# Patient Record
Sex: MALE | Race: WHITE | NOT HISPANIC OR LATINO | Employment: FULL TIME | URBAN - METROPOLITAN AREA
[De-identification: names, ages, dates, MRNs, and addresses within clinical notes are randomized per-mention and may not be internally consistent; named-entity substitution may affect disease eponyms.]

---

## 2018-10-04 ENCOUNTER — HOSPITAL ENCOUNTER (EMERGENCY)
Facility: MEDICAL CENTER | Age: 48
End: 2018-10-04
Attending: EMERGENCY MEDICINE
Payer: COMMERCIAL

## 2018-10-04 VITALS
HEIGHT: 71 IN | OXYGEN SATURATION: 98 % | RESPIRATION RATE: 16 BRPM | TEMPERATURE: 97.6 F | DIASTOLIC BLOOD PRESSURE: 84 MMHG | WEIGHT: 194.22 LBS | SYSTOLIC BLOOD PRESSURE: 140 MMHG | BODY MASS INDEX: 27.19 KG/M2 | HEART RATE: 47 BPM

## 2018-10-04 DIAGNOSIS — L02.91 ABSCESS: ICD-10-CM

## 2018-10-04 DIAGNOSIS — L03.90 CELLULITIS, UNSPECIFIED CELLULITIS SITE: ICD-10-CM

## 2018-10-04 DIAGNOSIS — D23.22 CYST, DERMOID, EAR, LEFT: ICD-10-CM

## 2018-10-04 PROCEDURE — 303977 HCHG I & D

## 2018-10-04 PROCEDURE — 99284 EMERGENCY DEPT VISIT MOD MDM: CPT

## 2018-10-04 PROCEDURE — 87077 CULTURE AEROBIC IDENTIFY: CPT

## 2018-10-04 PROCEDURE — 87070 CULTURE OTHR SPECIMN AEROBIC: CPT

## 2018-10-04 PROCEDURE — 87186 SC STD MICRODIL/AGAR DIL: CPT

## 2018-10-04 PROCEDURE — 87205 SMEAR GRAM STAIN: CPT

## 2018-10-04 RX ORDER — OMEPRAZOLE 20 MG/1
20 CAPSULE, DELAYED RELEASE ORAL DAILY
COMMUNITY

## 2018-10-04 RX ORDER — LEVOTHYROXINE SODIUM 0.1 MG/1
100 TABLET ORAL
COMMUNITY

## 2018-10-04 RX ORDER — DOXYCYCLINE HYCLATE 100 MG
100 TABLET ORAL 2 TIMES DAILY
COMMUNITY

## 2018-10-04 RX ORDER — CETIRIZINE HYDROCHLORIDE 10 MG/1
10 TABLET ORAL DAILY
COMMUNITY

## 2018-10-04 RX ORDER — IBUPROFEN 600 MG/1
600 TABLET ORAL EVERY 6 HOURS PRN
COMMUNITY

## 2018-10-04 ASSESSMENT — PAIN SCALES - GENERAL: PAINLEVEL_OUTOF10: 8

## 2018-10-05 LAB
GRAM STN SPEC: NORMAL
SIGNIFICANT IND 70042: NORMAL
SITE SITE: NORMAL
SOURCE SOURCE: NORMAL

## 2018-10-05 NOTE — ED PROVIDER NOTES
ED Provider Note    CHIEF COMPLAINT  Chief Complaint   Patient presents with   • Abscess        HPI  Oniel Coates is a 48 y.o. male who presents to the ED with complaints of an abscess behind the left ear.  Patient states about a week ago started to be a slightly large abscess that was draining apparently on its own.  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  Since then he states the abscess is still there seems to be getting slightly worse she presents to ED for evaluation.  Patient denies any fevers chills nausea vomiting he has been on doxycycline for about 2 days and is here for evaluation.    REVIEW OF SYSTEMS  See HPI for further details. All other systems are negative.     PAST MEDICAL HISTORY  History reviewed. No pertinent past medical history.    FAMILY HISTORY  History reviewed. No pertinent family history.  Patient's family history has been discussed and is been found to be noncontributory to his present illness  SOCIAL HISTORY  Social History     Social History   • Marital status: Single     Spouse name: N/A   • Number of children: N/A   • Years of education: N/A     Social History Main Topics   • Smoking status: Never Smoker   • Smokeless tobacco: Never Used   • Alcohol use Yes   • Drug use: No   • Sexual activity: Not on file     Other Topics Concern   • Not on file     Social History Narrative   • No narrative on file      No primary care provider on file.       SURGICAL HISTORY  History reviewed. No pertinent surgical history.    CURRENT MEDICATIONS   Home Medications     Reviewed by Clover Pereira R.N. (Registered Nurse) on 10/04/18 at 2005  Med List Status: Complete   Medication Last Dose Status   cetirizine (ZYRTEC) 10 MG Tab 10/4/2018 Active   doxycycline (VIBRAMYCIN) 100 MG Tab 10/4/2018 Active   ibuprofen (MOTRIN) 600 MG Tab 10/4/2018 Active   levothyroxine (SYNTHROID) 100 MCG Tab 10/4/2018 Active   omeprazole (PRILOSEC) 20 MG delayed-release  "capsule 10/4/2018 Active                ALLERGIES   No Known Allergies    PHYSICAL EXAM  VITAL SIGNS: /84   Pulse (!) 47   Temp 36.4 °C (97.6 °F)   Resp 16   Ht 1.803 m (5' 11\")   Wt 88.1 kg (194 lb 3.6 oz)   SpO2 98%   BMI 27.09 kg/m²    Pulse Ox interpretation nonhypoxic    Constitutional: Well developed, Well nourished, No acute distress, Non-toxic appearance.   Cardiovascular: Regular rate and rhythm without murmurs gallops or rubs.   Thorax & Lungs: Lungs are clear to auscultation bilaterally, there are no wheezes no rales. Chest wall is nontender.  Skin: Patient does have a abscess behind the left ear that is approximately 1 cm in diameter.  There is a central scab I was unable to get any discharge from it with gentle squeezing.  There is surrounding erythema.  Slightly tender in the area.  Extremities: Intact distal pulses, no clubbing, no cyanosis, no edema, nontender.      Incision and Drainage Procedure Note    Indication: Abscess    Procedure: The patient was positioned appropriately and the skin over the incision site was prepped with alcohol. Local anesthesia was obtained by infiltration using 1% Lidocaine with epinephrine.  An incision was then made over the apex of the lesion and very minimal purulence returned.  There appears to be thick material within material was expressed. Loculations were broken up using forceps and more of the material was able to be expressed. The drainage cavity was then packed with sterile gauze. The patient’s tetanus status was up to date and did not require a booster dose.    The patient tolerated the procedure well.    Complications: None      COURSE & MEDICAL DECISION MAKING  Pertinent Labs & Imaging studies reviewed. (See chart for details)  Patient presents with an abscess behind the left ear.  It was I&D at the evaluation of I think it is most of a infected dermoid cyst.  Is unable to completely clean out the core.  I will keep a drain in it.  Of " recommended cleaning techniques at home to follow-up with primary care physician 1 week for recheck return as needed.  The patient is to continue with the doxycycline.  I did send the culture down for evaluation of the wound.    FINAL IMPRESSION  1. Cyst, dermoid, ear, left    2. Abscess    3. Cellulitis, unspecified cellulitis site        The patient will return for new or worsening symptoms and is stable at the time of discharge.    The patient is referred to a primary physician for blood pressure management, diabetic screening, and for all other preventative health concerns.        DISPOSITION:  Patient will be discharged home in stable condition.    FOLLOW UP:  No follow-up provider specified.    OUTPATIENT MEDICATIONS:  Discharge Medication List as of 10/4/2018  9:40 PM            Electronically signed by: Oniel Diaz, 10/4/2018 8:35 PM

## 2018-10-05 NOTE — ED NOTES
Discharge information reviewed in detail. Patient verbalized understanding of discharge instructions to follow up with PCP and to return to ER if condition worsens.  Patient ambulated out of ER in a steady gait.

## 2018-10-07 LAB
BACTERIA WND AEROBE CULT: ABNORMAL
BACTERIA WND AEROBE CULT: ABNORMAL
GRAM STN SPEC: ABNORMAL
SIGNIFICANT IND 70042: ABNORMAL
SITE SITE: ABNORMAL
SOURCE SOURCE: ABNORMAL

## 2018-10-09 NOTE — ED NOTES
"ED Positive Culture Follow-up/Notification Note:   Date: 10/9/18    Patient seen in the ED on 10/4/2018 for a worsening abscess behind his left ear after taking two days of doxcycline from an outside physician.   1. Cyst, dermoid, ear, left    2. Abscess    3. Cellulitis, unspecified cellulitis site      Patient received I&D in the ED. However, the core was left in due to the inability to complete drain the abscess. The patient was educated to follow up with a primary physician.  Discharge Medication List as of 10/4/2018  9:40 PM        Allergies: Patient has no known allergies.    Vitals:    10/04/18 2002 10/04/18 2003 10/04/18 2140   BP:  140/84    Pulse:  (!) 50 (!) 47   Resp:  16    Temp:  (!) 35.7 °C (96.2 °F) 36.4 °C (97.6 °F)   SpO2:   98%   Weight: 88.1 kg (194 lb 3.6 oz)     Height: 1.803 m (5' 11\")         Final cultures:   Results     Procedure Component Value Units Date/Time    CULTURE WOUND W/ GRAM STAIN [[id number]]  (Abnormal)  (Susceptibility) Collected:  10/04/18 2123    Order Status:  Completed Specimen:  Wound from Abscess Updated:  10/07/18 1027     Significant Indicator POS (POS)     Source WND     Site ABSCESS     Culture Result Wound -- (A)     Gram Stain Result Many WBCs.  Moderate Gram positive cocci.       Culture Result Wound Methicillin Resistant Staphylococcus aureus  Moderate growth   (A)    Narrative:       CALL  [facility] tel. [phone],  CALLED  EDS tel. [phone] 10/07/2018, 10:26, [facility] ER [phone] + faxed to [facility]    Culture & Susceptibility     METHICILLIN RESISTANT STAPHYLOCOCCUS AUREUS     Antibiotic Sensitivity Microscan Unit Status    Ampicillin/sulbactam Resistant <=8/4 mcg/mL Final    Method: SENSITIVITY, JOE    Clindamycin Resistant >4 mcg/mL Final    Method: SENSITIVITY, JOE    Daptomycin Sensitive <=0.5 mcg/mL Final    Method: SENSITIVITY, JOE    Erythromycin Resistant >4 mcg/mL Final    Method: SENSITIVITY, JOE    Moxifloxacin Sensitive 2 mcg/mL Final    Method: " SENSITIVITY, JOE    Oxacillin Resistant >2 mcg/mL Final    Method: SENSITIVITY, JOE    Penicillin Resistant >8 mcg/mL Final    Method: SENSITIVITY, JOE    Tetracycline Resistant >8 mcg/mL Final    Method: SENSITIVITY, JOE    Trimeth/Sulfa Sensitive <=0.5/9.5 mcg/mL Final    Method: SENSITIVITY, JOE    Vancomycin Sensitive 2 mcg/mL Final    Method: SENSITIVITY, JOE                       GRAM STAIN [[id number]] Collected:  10/04/18 2123    Order Status:  Completed Specimen:  Wound Updated:  10/05/18 1709     Significant Indicator .     Source WND     Site ABSCESS     Gram Stain Result Many WBCs.  Moderate Gram positive cocci.            Plan:   Isolated organism is resistant to tetracycline and doxycycline is a tetracycline antibiotic.     I called the patient and he does have his follow up appointment scheduled, but states that the abscess is still not quite improving. 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 Also, he is not taking warfarin.     I discussed the culture result, JOE, and change in antibiotics with patient, who will  new a prescription at [facility].He will also follow up with the PCP. We discussed the core was left in and I&D did not allow for full source control. I recommended a longer duration of antibiotics at this time, but discussed that his physician may shorten it if source control is achieved.    New Rx:  Bactrim 1 DS tablet orally twice daily x 10 days #20, no refills - MD: Nadine per protocol    Marilyn Schneider, PharmD

## 2021-11-22 ENCOUNTER — HOSPITAL ENCOUNTER (EMERGENCY)
Facility: MEDICAL CENTER | Age: 51
End: 2021-11-22
Payer: COMMERCIAL

## 2021-11-22 LAB — EKG IMPRESSION: NORMAL

## 2021-11-22 PROCEDURE — 93005 ELECTROCARDIOGRAM TRACING: CPT | Performed by: EMERGENCY MEDICINE

## 2021-11-22 PROCEDURE — 302449 STATCHG TRIAGE ONLY (STATISTIC)
